# Patient Record
Sex: FEMALE | Race: WHITE | NOT HISPANIC OR LATINO | Employment: OTHER | URBAN - METROPOLITAN AREA
[De-identification: names, ages, dates, MRNs, and addresses within clinical notes are randomized per-mention and may not be internally consistent; named-entity substitution may affect disease eponyms.]

---

## 2024-04-25 ENCOUNTER — PATIENT OUTREACH (OUTPATIENT)
Dept: ONCOLOGY | Facility: CLINIC | Age: 59
End: 2024-04-25
Payer: MEDICARE

## 2024-04-25 ENCOUNTER — TRANSCRIBE ORDERS (OUTPATIENT)
Dept: OTHER | Age: 59
End: 2024-04-25

## 2024-04-25 DIAGNOSIS — C54.1 ENDOMETRIAL CANCER (H): Primary | ICD-10-CM

## 2024-04-25 NOTE — PROGRESS NOTES
New Patient Hematology / Oncology Nurse Navigator Note     Referral Date: 4/25/24    Referring provider: Dr MANDO Velásquez    Referring Clinic/Organization: [facility] / [facility]      Referred to: Gynecology Oncology    Requested provider (if applicable): First available - did not specify     Evaluation for :      Clinical History (per Nurse review of records provided):    4/17/24 path results -- BOOKMARKED      Clinical Assessment / Barriers to Care (Per Nurse):    None identified at time of call, discuss referral concerns and what to expect at initial consult visit.      Records Location: Care Everywhere     Records Needed:     See Pre-Visit encounter from CSS team for additional details on records collection. Additional questions on records needed for initial consult can be sent to P ON[facility] ADULT NEW PATIENT RECORDS [[id number]] with a copy to  CANCER CARE NURSE NAVIGATION [[id number]]. Please include diagnosis and urgency in subject line.    Additional testing needed prior to consult:     N/A    Referral updates and Plan:     At time of call, patient declined to schedule with [facility] as she is seeking care elsewhere.      Lori Pérez, MAHESHN, RN, PHN, OCN  Hematology/Oncology Nurse Navigator   [facility]   [phone]   [email or web address]

## 2024-05-16 ENCOUNTER — TRANSFERRED RECORDS (OUTPATIENT)
Dept: HEALTH INFORMATION MANAGEMENT | Facility: CLINIC | Age: 59
End: 2024-05-16

## 2024-06-02 NOTE — H&P (VIEW-ONLY)
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 DNA MMR all intact. She presents today for treatment recommendations.    Clinical Course:  21: Pap smear NILM.  24: Toney presented with postmenopausal bleeding for 7 months after not having any periods for 17 years. She has been having very light vaginal bleeding but does require a panty liner. Her bleeding has been regular about every 6 weeks and lasts a few weeks.  24: Endometrial biopsy was negative for hyperplasia, atypia, or malignancy.  ASCUS Pap smear, HPV-.  24: Pelvic ultrasound showed abnormal heterogeneous thickening of the postmenopausal endometrium measuring up to 23 mm with internal vascularity. Recommend gynecology consultation and tissue sampling. The ovaries are obscured by bowel gas.  24: She underwent a diagnostic hysteroscopy, D&C with polypectomy. 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 DNA MMR all intact.  Genetic Testing (Gyn Oncology):  N/A    Interval History (Gyn Oncology):  Toney presents today for treatment recommendations. She has been having postmenopausal bleeding light vaginal spotting for the last year or so. She had bleeding every 8 weeks. She had no abnormal bleeding  when she had periods. Her periods stopped at age 41.    Review of Systems:  A complete 14-point review of systems is negative except as noted in the above history of present illness.    Past Medical History:  Postmenopausal bleeding  Endometrial thickening  Hypothyroidism  Allergic rhinitis  Hyperlipidemia  Rheumatoid arthritis involving left ankle with +RA factor  Lyme disease  Elevated blood sugar  Prediabetes  Obesity  Bilateral knee osteoarthritis    Surgical History:  Right ankle fusion due to arthritis 2020  Left ankle fusion due to arthritis      Colonoscopy with polypectomy   Right knee replacement 2021  Left knee replacement   Endometrial biopsy 24  Diagnostic hysteroscopy, D&C with polypectomy 24    OB/Gyn History:  Menarche age: 11  .  x1 (twins). First live birth at 30  LMP: Age 41  Last mammogram Spring of 2023, normal  Last Pap smear 24, ASCUS, HPV-. Prior Pap smears NILM  Last colonoscopy with polypectomy   Denies history of HRT    Allergies#  Sulfamide    Medications:  Omega 3 Fatty Acids-Fish Oil Oral 684 mg-1,200 mg  Vitamin D3 (Cholecalciferol Oral) 50 mcg (2,000 unit) tablet  Calcium Phosphate-Vitamin D3 Oral Chewable 250 mg-5 mcg (200 unit)  Cytotec (Misoprostol Oral) 200 mcg tablet  Levothyroxine Oral 150 mcg capsule  Advil (Ibuprofen Oral) 200 mg tablet  Family History:  Maternal great grandaunts x2 - Breast cancers in their 40s and 80s  Maternal grandfather - Liver cancer (alcoholic/smoker)    Social History:    Sexually active  Lives in a house  Retired  Never smoker  Drinks moderate amount of alcohol  No illicit drug use    Health Maintenance:    Vital Signs:  Blood pressure: 146/86, Pulse: 98, Temperature: 97.9 F, Respirations: 16, O2 sat: 98%, Pain Scale: 0, Height: 67 in, Weight: 221 lb, BSA: 2.11, BMI: 34.61 kg/m2    Physical Exam (Gyn Oncology):  General: Alert and oriented x3, no acute distress. Obese.  HEENT:  Normocephalic, atraumatic.  Lymph node exam: No supraclavicular, submandibular, or cervical lymphadenopathy.  CV: Regular rate and rhythm, no murmurs, rubs or gallops.  Respiratory: Clear to auscultation bilaterally, no wheezes, rales, or rhonchi.  Abdomen: Soft, non-tender to palpation, no rebound or guarding.  Lower Extremity Exam: No lower extremity edema, no palpable cords.  Neuro: Cranial nerves II-XII intact, gross motor skills intact.  Genitourinary exam: Deferred.    Laboratory Data:  PATHOLOGY  24: Diagnostic Hysteroscopy, D&C with Polypectomy  FINAL DIAGNOSIS  A. Endometrium, endometrial shavings/polyp, curettage:  - Endometrioid adenocarcinoma, FIGO grade 1, with extensive squamous metaplasia  - Background of complex atypical hyperplasia involving endometrial polyp fragments  - Results of Immunostaining for Mismatch Repair Proteins (MMR), block A3:  - MLH1: Intact nuclear expression  - PMS2: Intact nuclear expression  - MSH2: Intact nuclear expression  - MSH6: Intact nuclear expression    B. Endometrium, endometrial curettings, curettage:  - Focal endometrioid adenocarcinoma, FIGO grade 1 in a background of complex atypical hyperplasia    Imagin24: US Pelvis  IMPRESSION:  Abnormal heterogeneous thickening of the postmenopausal endometrium measuring up to 23 mm with internal vascularity. Recommend gynecology consultation and tissue sampling. The ovaries are obscured by bowel gas.    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 DNA MMR all intact. She presents today for treatment recommendations.  FIGO grade 1 endometrioid adenocarcinoma of the uterus with extensive squamous  metaplasia, in a background of complex, atypical hyperplasia - Migdalia, her , and I reviewed her recent history of postmenopausal bleeding and diagnosis of a low-grade endometrial adenocarcinoma of the uterus. We reviewed underlying inciting factors and reviewed the general, good prognosis, associated with this diagnosis. We reviewed that most patients are diagnosed with stage 1 at the time of presentation and that most will not require any treatments after surgery. We reviewed the high probability of stage 1 disease at the time of presentation for surgery. We reviewed the standard-of-care to proceed with a robotic-assisted total laparoscopic hysterectomy with a bilateral salpingo-oophorectomy and sentinel lymph node injection and biopsy. We reviewed the risks of surgery, including bleeding or infection and potential damage to surrounding structures, including the bowel, bladder, and bilateral ureters. We reviewed general perioperative expectations and also discussed the need for a preoperative history and physical, prior to surgery. Migdalia would like her procedure performed for June 11??, due to having family in [facility], prior to the procedure. We will schedule her procedure for that day.  Thank you very much for allowing me to take part in the care of this very sweet patient.    3 minutes in reviewing records, 21 minutes in discussion, 2 minutes ordering labs.    Pain Care Management:  Pain Scale: 0    Patient Care needs:  Depressions Status: Not recorded on visit  Smoking Status: Smoking Tobacco : Never smoker; Smokeless Tobacco : none found; Vaping : none found  Documentation assistance provided by kim Jesus for Dr. Milagros Rae, on 5/16/2024.  I, Dr. Milagros Rae, personally performed the services described in this documentation, and it is both accurate and complete.    Milagros Rae MD  Phone: [phone]  Fax: [phone]

## 2024-06-02 NOTE — CONSULTS
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 DNA MMR all intact. She presents today for treatment recommendations.    Clinical Course:  21: Pap smear NILM.  24: Toney presented with postmenopausal bleeding for 7 months after not having any periods for 17 years. She has been having very light vaginal bleeding but does require a panty liner. Her bleeding has been regular about every 6 weeks and lasts a few weeks.  24: Endometrial biopsy was negative for hyperplasia, atypia, or malignancy.  ASCUS Pap smear, HPV-.  24: Pelvic ultrasound showed abnormal heterogeneous thickening of the postmenopausal endometrium measuring up to 23 mm with internal vascularity. Recommend gynecology consultation and tissue sampling. The ovaries are obscured by bowel gas.  24: She underwent a diagnostic hysteroscopy, D&C with polypectomy. 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 DNA MMR all intact.  Genetic Testing (Gyn Oncology):  N/A    Interval History (Gyn Oncology):  Toney presents today for treatment recommendations. She has been having postmenopausal bleeding light vaginal spotting for the last year or so. She had bleeding every 8 weeks. She had no abnormal bleeding  when she had periods. Her periods stopped at age 41.    Review of Systems:  A complete 14-point review of systems is negative except as noted in the above history of present illness.    Past Medical History:  Postmenopausal bleeding  Endometrial thickening  Hypothyroidism  Allergic rhinitis  Hyperlipidemia  Rheumatoid arthritis involving left ankle with +RA factor  Lyme disease  Elevated blood sugar  Prediabetes  Obesity  Bilateral knee osteoarthritis    Surgical History:  Right ankle fusion due to arthritis 2020  Left ankle fusion due to arthritis      Colonoscopy with polypectomy   Right knee replacement 2021  Left knee replacement   Endometrial biopsy 24  Diagnostic hysteroscopy, D&C with polypectomy 24    OB/Gyn History:  Menarche age: 11  .  x1 (twins). First live birth at 30  LMP: Age 41  Last mammogram Spring of 2023, normal  Last Pap smear 24, ASCUS, HPV-. Prior Pap smears NILM  Last colonoscopy with polypectomy   Denies history of HRT    Allergies#  Sulfamide    Medications:  Omega 3 Fatty Acids-Fish Oil Oral 684 mg-1,200 mg  Vitamin D3 (Cholecalciferol Oral) 50 mcg (2,000 unit) tablet  Calcium Phosphate-Vitamin D3 Oral Chewable 250 mg-5 mcg (200 unit)  Cytotec (Misoprostol Oral) 200 mcg tablet  Levothyroxine Oral 150 mcg capsule  Advil (Ibuprofen Oral) 200 mg tablet  Family History:  Maternal great grandaunts x2 - Breast cancers in their 40s and 80s  Maternal grandfather - Liver cancer (alcoholic/smoker)    Social History:    Sexually active  Lives in a house  Retired  Never smoker  Drinks moderate amount of alcohol  No illicit drug use    Health Maintenance:    Vital Signs:  Blood pressure: 146/86, Pulse: 98, Temperature: 97.9 F, Respirations: 16, O2 sat: 98%, Pain Scale: 0, Height: 67 in, Weight: 221 lb, BSA: 2.11, BMI: 34.61 kg/m2    Physical Exam (Gyn Oncology):  General: Alert and oriented x3, no acute distress. Obese.  HEENT:  Normocephalic, atraumatic.  Lymph node exam: No supraclavicular, submandibular, or cervical lymphadenopathy.  CV: Regular rate and rhythm, no murmurs, rubs or gallops.  Respiratory: Clear to auscultation bilaterally, no wheezes, rales, or rhonchi.  Abdomen: Soft, non-tender to palpation, no rebound or guarding.  Lower Extremity Exam: No lower extremity edema, no palpable cords.  Neuro: Cranial nerves II-XII intact, gross motor skills intact.  Genitourinary exam: Deferred.    Laboratory Data:  PATHOLOGY  24: Diagnostic Hysteroscopy, D&C with Polypectomy  FINAL DIAGNOSIS  A. Endometrium, endometrial shavings/polyp, curettage:  - Endometrioid adenocarcinoma, FIGO grade 1, with extensive squamous metaplasia  - Background of complex atypical hyperplasia involving endometrial polyp fragments  - Results of Immunostaining for Mismatch Repair Proteins (MMR), block A3:  - MLH1: Intact nuclear expression  - PMS2: Intact nuclear expression  - MSH2: Intact nuclear expression  - MSH6: Intact nuclear expression    B. Endometrium, endometrial curettings, curettage:  - Focal endometrioid adenocarcinoma, FIGO grade 1 in a background of complex atypical hyperplasia    Imagin24: US Pelvis  IMPRESSION:  Abnormal heterogeneous thickening of the postmenopausal endometrium measuring up to 23 mm with internal vascularity. Recommend gynecology consultation and tissue sampling. The ovaries are obscured by bowel gas.    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 DNA MMR all intact. She presents today for treatment recommendations.  FIGO grade 1 endometrioid adenocarcinoma of the uterus with extensive squamous  metaplasia, in a background of complex, atypical hyperplasia - Migdalia, her , and I reviewed her recent history of postmenopausal bleeding and diagnosis of a low-grade endometrial adenocarcinoma of the uterus. We reviewed underlying inciting factors and reviewed the general, good prognosis, associated with this diagnosis. We reviewed that most patients are diagnosed with stage 1 at the time of presentation and that most will not require any treatments after surgery. We reviewed the high probability of stage 1 disease at the time of presentation for surgery. We reviewed the standard-of-care to proceed with a robotic-assisted total laparoscopic hysterectomy with a bilateral salpingo-oophorectomy and sentinel lymph node injection and biopsy. We reviewed the risks of surgery, including bleeding or infection and potential damage to surrounding structures, including the bowel, bladder, and bilateral ureters. We reviewed general perioperative expectations and also discussed the need for a preoperative history and physical, prior to surgery. Migdalia would like her procedure performed for June 11??, due to having family in [facility], prior to the procedure. We will schedule her procedure for that day.  Thank you very much for allowing me to take part in the care of this very sweet patient.    3 minutes in reviewing records, 21 minutes in discussion, 2 minutes ordering labs.    Pain Care Management:  Pain Scale: 0    Patient Care needs:  Depressions Status: Not recorded on visit  Smoking Status: Smoking Tobacco : Never smoker; Smokeless Tobacco : none found; Vaping : none found  Documentation assistance provided by kim Jesus for Dr. Milagros Rae, on 5/16/2024.  I, Dr. Milagros Rae, personally performed the services described in this documentation, and it is both accurate and complete.    Milagros Rae MD  Phone: [phone]  Fax: [phone]

## 2024-06-07 RX ORDER — IBUPROFEN 200 MG
200 TABLET ORAL EVERY 4 HOURS PRN
Status: ON HOLD | COMMUNITY
End: 2024-06-11

## 2024-06-07 RX ORDER — CHLORAL HYDRATE 500 MG
2 CAPSULE ORAL DAILY
COMMUNITY

## 2024-06-07 RX ORDER — LEVOTHYROXINE SODIUM 150 UG/1
150 TABLET ORAL DAILY
COMMUNITY

## 2024-06-11 ENCOUNTER — ANESTHESIA (OUTPATIENT)
Dept: SURGERY | Facility: HOSPITAL | Age: 59
End: 2024-06-11
Payer: MEDICARE

## 2024-06-11 ENCOUNTER — ANESTHESIA EVENT (OUTPATIENT)
Dept: SURGERY | Facility: HOSPITAL | Age: 59
End: 2024-06-11
Payer: MEDICARE

## 2024-06-11 ENCOUNTER — HOSPITAL ENCOUNTER (OUTPATIENT)
Facility: HOSPITAL | Age: 59
Discharge: HOME | End: 2024-06-11
Attending: OBSTETRICS & GYNECOLOGY | Admitting: OBSTETRICS & GYNECOLOGY
Payer: MEDICARE

## 2024-06-11 VITALS
RESPIRATION RATE: 20 BRPM | DIASTOLIC BLOOD PRESSURE: 89 MMHG | SYSTOLIC BLOOD PRESSURE: 150 MMHG | TEMPERATURE: 98.2 F | HEART RATE: 78 BPM | OXYGEN SATURATION: 96 % | WEIGHT: 219.6 LBS | BODY MASS INDEX: 34.47 KG/M2 | HEIGHT: 67 IN

## 2024-06-11 DIAGNOSIS — C54.1 ENDOMETRIAL CANCER (H): Primary | ICD-10-CM

## 2024-06-11 LAB — HCG UR QL: NEGATIVE

## 2024-06-11 PROCEDURE — 710N000009 HC RECOVERY PHASE 1, LEVEL 1, PER MIN: Performed by: OBSTETRICS & GYNECOLOGY

## 2024-06-11 PROCEDURE — 250N000009 HC RX 250: Performed by: NURSE ANESTHETIST, CERTIFIED REGISTERED

## 2024-06-11 PROCEDURE — 250N000011 HC RX IP 250 OP 636: Performed by: OBSTETRICS & GYNECOLOGY

## 2024-06-11 PROCEDURE — 258N000001 HC RX 258: Performed by: OBSTETRICS & GYNECOLOGY

## 2024-06-11 PROCEDURE — 258N000003 HC RX IP 258 OP 636: Performed by: ANESTHESIOLOGY

## 2024-06-11 PROCEDURE — 88307 TISSUE EXAM BY PATHOLOGIST: CPT | Mod: TC | Performed by: OBSTETRICS & GYNECOLOGY

## 2024-06-11 PROCEDURE — 710N000012 HC RECOVERY PHASE 2, PER MINUTE: Performed by: OBSTETRICS & GYNECOLOGY

## 2024-06-11 PROCEDURE — 81025 URINE PREGNANCY TEST: CPT | Performed by: PHYSICIAN ASSISTANT

## 2024-06-11 PROCEDURE — 999N000141 HC STATISTIC PRE-PROCEDURE NURSING ASSESSMENT: Performed by: OBSTETRICS & GYNECOLOGY

## 2024-06-11 PROCEDURE — 258N000003 HC RX IP 258 OP 636: Performed by: NURSE ANESTHETIST, CERTIFIED REGISTERED

## 2024-06-11 PROCEDURE — 272N000001 HC OR GENERAL SUPPLY STERILE: Performed by: OBSTETRICS & GYNECOLOGY

## 2024-06-11 PROCEDURE — 370N000017 HC ANESTHESIA TECHNICAL FEE, PER MIN: Performed by: OBSTETRICS & GYNECOLOGY

## 2024-06-11 PROCEDURE — 250N000025 HC SEVOFLURANE, PER MIN: Performed by: OBSTETRICS & GYNECOLOGY

## 2024-06-11 PROCEDURE — 250N000009 HC RX 250: Performed by: OBSTETRICS & GYNECOLOGY

## 2024-06-11 PROCEDURE — 250N000011 HC RX IP 250 OP 636: Mod: JZ | Performed by: NURSE ANESTHETIST, CERTIFIED REGISTERED

## 2024-06-11 PROCEDURE — 360N000080 HC SURGERY LEVEL 7, PER MIN: Performed by: OBSTETRICS & GYNECOLOGY

## 2024-06-11 PROCEDURE — 250N000011 HC RX IP 250 OP 636: Mod: JZ | Performed by: PHYSICIAN ASSISTANT

## 2024-06-11 PROCEDURE — 88342 IMHCHEM/IMCYTCHM 1ST ANTB: CPT | Mod: TC,59 | Performed by: OBSTETRICS & GYNECOLOGY

## 2024-06-11 PROCEDURE — 250N000011 HC RX IP 250 OP 636: Performed by: PHYSICIAN ASSISTANT

## 2024-06-11 RX ORDER — FENTANYL CITRATE 50 UG/ML
INJECTION, SOLUTION INTRAMUSCULAR; INTRAVENOUS PRN
Status: DISCONTINUED | OUTPATIENT
Start: 2024-06-11 | End: 2024-06-11

## 2024-06-11 RX ORDER — CEFAZOLIN SODIUM/WATER 2 G/20 ML
2 SYRINGE (ML) INTRAVENOUS
Status: COMPLETED | OUTPATIENT
Start: 2024-06-11 | End: 2024-06-11

## 2024-06-11 RX ORDER — OXYCODONE HYDROCHLORIDE 5 MG/1
10 TABLET ORAL
Status: CANCELLED | OUTPATIENT
Start: 2024-06-11

## 2024-06-11 RX ORDER — BUPIVACAINE HYDROCHLORIDE 5 MG/ML
INJECTION, SOLUTION PERINEURAL PRN
Status: DISCONTINUED | OUTPATIENT
Start: 2024-06-11 | End: 2024-06-11 | Stop reason: HOSPADM

## 2024-06-11 RX ORDER — PROPOFOL 10 MG/ML
INJECTION, EMULSION INTRAVENOUS CONTINUOUS PRN
Status: DISCONTINUED | OUTPATIENT
Start: 2024-06-11 | End: 2024-06-11

## 2024-06-11 RX ORDER — ONDANSETRON 4 MG/1
4 TABLET, ORALLY DISINTEGRATING ORAL EVERY 30 MIN PRN
Status: CANCELLED | OUTPATIENT
Start: 2024-06-11

## 2024-06-11 RX ORDER — NALOXONE HYDROCHLORIDE 0.4 MG/ML
0.1 INJECTION, SOLUTION INTRAMUSCULAR; INTRAVENOUS; SUBCUTANEOUS
Status: DISCONTINUED | OUTPATIENT
Start: 2024-06-11 | End: 2024-06-11 | Stop reason: HOSPADM

## 2024-06-11 RX ORDER — KETAMINE HYDROCHLORIDE 10 MG/ML
INJECTION INTRAMUSCULAR; INTRAVENOUS PRN
Status: DISCONTINUED | OUTPATIENT
Start: 2024-06-11 | End: 2024-06-11

## 2024-06-11 RX ORDER — PROPOFOL 10 MG/ML
INJECTION, EMULSION INTRAVENOUS PRN
Status: DISCONTINUED | OUTPATIENT
Start: 2024-06-11 | End: 2024-06-11

## 2024-06-11 RX ORDER — ACETAMINOPHEN 325 MG/1
975 TABLET ORAL EVERY 6 HOURS PRN
Status: CANCELLED | OUTPATIENT
Start: 2024-06-11

## 2024-06-11 RX ORDER — AMOXICILLIN 250 MG
1-2 CAPSULE ORAL 2 TIMES DAILY PRN
COMMUNITY
Start: 2024-06-11

## 2024-06-11 RX ORDER — IBUPROFEN 200 MG
800 TABLET ORAL EVERY 6 HOURS PRN
Status: CANCELLED | OUTPATIENT
Start: 2024-06-11

## 2024-06-11 RX ORDER — HYDROMORPHONE HCL IN WATER/PF 6 MG/30 ML
0.4 PATIENT CONTROLLED ANALGESIA SYRINGE INTRAVENOUS EVERY 5 MIN PRN
Status: DISCONTINUED | OUTPATIENT
Start: 2024-06-11 | End: 2024-06-11 | Stop reason: HOSPADM

## 2024-06-11 RX ORDER — LIDOCAINE 40 MG/G
CREAM TOPICAL
Status: DISCONTINUED | OUTPATIENT
Start: 2024-06-11 | End: 2024-06-11 | Stop reason: HOSPADM

## 2024-06-11 RX ORDER — DEXAMETHASONE SODIUM PHOSPHATE 10 MG/ML
INJECTION, SOLUTION INTRAMUSCULAR; INTRAVENOUS PRN
Status: DISCONTINUED | OUTPATIENT
Start: 2024-06-11 | End: 2024-06-11

## 2024-06-11 RX ORDER — IBUPROFEN 200 MG
600 TABLET ORAL EVERY 6 HOURS PRN
COMMUNITY
Start: 2024-06-11

## 2024-06-11 RX ORDER — SODIUM CHLORIDE, SODIUM LACTATE, POTASSIUM CHLORIDE, CALCIUM CHLORIDE 600; 310; 30; 20 MG/100ML; MG/100ML; MG/100ML; MG/100ML
INJECTION, SOLUTION INTRAVENOUS CONTINUOUS
Status: DISCONTINUED | OUTPATIENT
Start: 2024-06-11 | End: 2024-06-11 | Stop reason: HOSPADM

## 2024-06-11 RX ORDER — METRONIDAZOLE 500 MG/100ML
500 INJECTION, SOLUTION INTRAVENOUS
Status: COMPLETED | OUTPATIENT
Start: 2024-06-11 | End: 2024-06-11

## 2024-06-11 RX ORDER — DEXAMETHASONE SODIUM PHOSPHATE 4 MG/ML
4 INJECTION, SOLUTION INTRA-ARTICULAR; INTRALESIONAL; INTRAMUSCULAR; INTRAVENOUS; SOFT TISSUE
Status: DISCONTINUED | OUTPATIENT
Start: 2024-06-11 | End: 2024-06-11 | Stop reason: HOSPADM

## 2024-06-11 RX ORDER — ONDANSETRON 4 MG/1
4 TABLET, ORALLY DISINTEGRATING ORAL EVERY 30 MIN PRN
Status: DISCONTINUED | OUTPATIENT
Start: 2024-06-11 | End: 2024-06-11 | Stop reason: HOSPADM

## 2024-06-11 RX ORDER — KETOROLAC TROMETHAMINE 30 MG/ML
INJECTION, SOLUTION INTRAMUSCULAR; INTRAVENOUS PRN
Status: DISCONTINUED | OUTPATIENT
Start: 2024-06-11 | End: 2024-06-11

## 2024-06-11 RX ORDER — ONDANSETRON 2 MG/ML
4 INJECTION INTRAMUSCULAR; INTRAVENOUS EVERY 30 MIN PRN
Status: CANCELLED | OUTPATIENT
Start: 2024-06-11

## 2024-06-11 RX ORDER — FENTANYL CITRATE 50 UG/ML
25 INJECTION, SOLUTION INTRAMUSCULAR; INTRAVENOUS EVERY 5 MIN PRN
Status: DISCONTINUED | OUTPATIENT
Start: 2024-06-11 | End: 2024-06-11 | Stop reason: HOSPADM

## 2024-06-11 RX ORDER — HYDROMORPHONE HCL IN WATER/PF 6 MG/30 ML
0.2 PATIENT CONTROLLED ANALGESIA SYRINGE INTRAVENOUS EVERY 5 MIN PRN
Status: DISCONTINUED | OUTPATIENT
Start: 2024-06-11 | End: 2024-06-11 | Stop reason: HOSPADM

## 2024-06-11 RX ORDER — FENTANYL CITRATE 50 UG/ML
50 INJECTION, SOLUTION INTRAMUSCULAR; INTRAVENOUS EVERY 5 MIN PRN
Status: DISCONTINUED | OUTPATIENT
Start: 2024-06-11 | End: 2024-06-11 | Stop reason: HOSPADM

## 2024-06-11 RX ORDER — CEFAZOLIN SODIUM/WATER 2 G/20 ML
2 SYRINGE (ML) INTRAVENOUS SEE ADMIN INSTRUCTIONS
Status: DISCONTINUED | OUTPATIENT
Start: 2024-06-11 | End: 2024-06-11 | Stop reason: HOSPADM

## 2024-06-11 RX ORDER — OXYCODONE HYDROCHLORIDE 5 MG/1
5 TABLET ORAL EVERY 4 HOURS PRN
Qty: 8 TABLET | Refills: 0 | Status: SHIPPED | OUTPATIENT
Start: 2024-06-11

## 2024-06-11 RX ORDER — INDOCYANINE GREEN AND WATER 25 MG
KIT INJECTION PRN
Status: DISCONTINUED | OUTPATIENT
Start: 2024-06-11 | End: 2024-06-11 | Stop reason: HOSPADM

## 2024-06-11 RX ORDER — WATER 10 ML/10ML
INJECTION INTRAMUSCULAR; INTRAVENOUS; SUBCUTANEOUS PRN
Status: DISCONTINUED | OUTPATIENT
Start: 2024-06-11 | End: 2024-06-11 | Stop reason: HOSPADM

## 2024-06-11 RX ORDER — OXYCODONE HYDROCHLORIDE 5 MG/1
5 TABLET ORAL
Status: CANCELLED | OUTPATIENT
Start: 2024-06-11

## 2024-06-11 RX ORDER — ONDANSETRON 2 MG/ML
INJECTION INTRAMUSCULAR; INTRAVENOUS PRN
Status: DISCONTINUED | OUTPATIENT
Start: 2024-06-11 | End: 2024-06-11

## 2024-06-11 RX ORDER — NALOXONE HYDROCHLORIDE 0.4 MG/ML
0.1 INJECTION, SOLUTION INTRAMUSCULAR; INTRAVENOUS; SUBCUTANEOUS
Status: CANCELLED | OUTPATIENT
Start: 2024-06-11

## 2024-06-11 RX ORDER — DEXAMETHASONE SODIUM PHOSPHATE 10 MG/ML
4 INJECTION, SOLUTION INTRAMUSCULAR; INTRAVENOUS
Status: CANCELLED | OUTPATIENT
Start: 2024-06-11

## 2024-06-11 RX ORDER — HYDROXYZINE HYDROCHLORIDE 25 MG/1
25 TABLET, FILM COATED ORAL
Status: CANCELLED | OUTPATIENT
Start: 2024-06-11

## 2024-06-11 RX ORDER — LIDOCAINE HYDROCHLORIDE 10 MG/ML
INJECTION, SOLUTION INFILTRATION; PERINEURAL PRN
Status: DISCONTINUED | OUTPATIENT
Start: 2024-06-11 | End: 2024-06-11

## 2024-06-11 RX ORDER — ONDANSETRON 2 MG/ML
4 INJECTION INTRAMUSCULAR; INTRAVENOUS EVERY 30 MIN PRN
Status: DISCONTINUED | OUTPATIENT
Start: 2024-06-11 | End: 2024-06-11 | Stop reason: HOSPADM

## 2024-06-11 RX ORDER — ACETAMINOPHEN 500 MG
500-1000 TABLET ORAL EVERY 6 HOURS PRN
COMMUNITY
Start: 2024-06-11

## 2024-06-11 RX ADMIN — KETAMINE HYDROCHLORIDE 20 MG: 10 INJECTION INTRAMUSCULAR; INTRAVENOUS at 11:31

## 2024-06-11 RX ADMIN — Medication 2 G: at 11:19

## 2024-06-11 RX ADMIN — ROCURONIUM BROMIDE 50 MG: 50 INJECTION, SOLUTION INTRAVENOUS at 11:31

## 2024-06-11 RX ADMIN — SODIUM CHLORIDE, POTASSIUM CHLORIDE, SODIUM LACTATE AND CALCIUM CHLORIDE: 600; 310; 30; 20 INJECTION, SOLUTION INTRAVENOUS at 13:01

## 2024-06-11 RX ADMIN — ROCURONIUM BROMIDE 20 MG: 50 INJECTION, SOLUTION INTRAVENOUS at 12:14

## 2024-06-11 RX ADMIN — ONDANSETRON 4 MG: 2 INJECTION INTRAMUSCULAR; INTRAVENOUS at 13:09

## 2024-06-11 RX ADMIN — KETAMINE HYDROCHLORIDE 30 MG: 10 INJECTION INTRAMUSCULAR; INTRAVENOUS at 11:54

## 2024-06-11 RX ADMIN — PHENYLEPHRINE HYDROCHLORIDE 150 MCG: 10 INJECTION INTRAVENOUS at 11:58

## 2024-06-11 RX ADMIN — KETOROLAC TROMETHAMINE 15 MG: 30 INJECTION, SOLUTION INTRAMUSCULAR at 13:09

## 2024-06-11 RX ADMIN — PROPOFOL 170 MG: 10 INJECTION, EMULSION INTRAVENOUS at 11:31

## 2024-06-11 RX ADMIN — PHENYLEPHRINE HYDROCHLORIDE 100 MCG: 10 INJECTION INTRAVENOUS at 11:49

## 2024-06-11 RX ADMIN — LIDOCAINE HYDROCHLORIDE 5 ML: 10 INJECTION, SOLUTION INFILTRATION; PERINEURAL at 11:31

## 2024-06-11 RX ADMIN — DEXAMETHASONE SODIUM PHOSPHATE 10 MG: 10 INJECTION, SOLUTION INTRAMUSCULAR; INTRAVENOUS at 11:32

## 2024-06-11 RX ADMIN — DEXMEDETOMIDINE HYDROCHLORIDE 20 MCG: 100 INJECTION, SOLUTION INTRAVENOUS at 11:19

## 2024-06-11 RX ADMIN — PHENYLEPHRINE HYDROCHLORIDE 100 MCG: 10 INJECTION INTRAVENOUS at 12:00

## 2024-06-11 RX ADMIN — METRONIDAZOLE 500 MG: 500 INJECTION, SOLUTION INTRAVENOUS at 10:03

## 2024-06-11 RX ADMIN — SUGAMMADEX 200 MG: 100 INJECTION, SOLUTION INTRAVENOUS at 13:19

## 2024-06-11 RX ADMIN — ROCURONIUM BROMIDE 20 MG: 50 INJECTION, SOLUTION INTRAVENOUS at 11:54

## 2024-06-11 RX ADMIN — PHENYLEPHRINE HYDROCHLORIDE 0.5 MCG/KG/MIN: 10 INJECTION INTRAVENOUS at 12:03

## 2024-06-11 RX ADMIN — PROPOFOL 100 MCG/KG/MIN: 10 INJECTION, EMULSION INTRAVENOUS at 11:43

## 2024-06-11 RX ADMIN — FENTANYL CITRATE 100 MCG: 50 INJECTION INTRAMUSCULAR; INTRAVENOUS at 11:31

## 2024-06-11 RX ADMIN — SODIUM CHLORIDE, POTASSIUM CHLORIDE, SODIUM LACTATE AND CALCIUM CHLORIDE: 600; 310; 30; 20 INJECTION, SOLUTION INTRAVENOUS at 10:04

## 2024-06-11 ASSESSMENT — ACTIVITIES OF DAILY LIVING (ADL)
ADLS_ACUITY_SCORE: 20
ADLS_ACUITY_SCORE: 20
ADLS_ACUITY_SCORE: 27
ADLS_ACUITY_SCORE: 20

## 2024-06-11 NOTE — ANESTHESIA PREPROCEDURE EVALUATION
Anesthesia Pre-Procedure Evaluation    Patient: Migdalia Padilla   MRN: [id number] : 1965        Procedure : Procedure(s):  ROBOTIC ASSISTED TOTAL LAPAROSCOPIC HYSTERECTOMY,  BILATERAL SALPINGO OOPHORECTOMY,  SENTINEL LYMPH NODE INJECTION AND BIOPSY          Past Medical History:   Diagnosis Date    Allergic rhinitis     Endometrial cancer (H)     Hyperlipidemia     Hypothyroidism     Rheumatoid arthritis involving left ankle with positive rheumatoid factor (H)       Past Surgical History:   Procedure Laterality Date    ANKLE FUSION Left       ANKLE FUSION Right 2020      BIOPSY ENDOMETRIAL 2024       SECTION       COLONOSCOPY W/ POLYPECTOMY 2016      COMBINED HYSTEROSCOPY DIAGNOSTIC / D&C 2024      KNEE REPLACEMENT Left       KNEE REPLACEMENT Right 2021        Allergies   Allergen Reactions    Sulfa Antibiotics Rash      Social History     Tobacco Use    Smoking status: Never    Smokeless tobacco: Never   Substance Use Topics    Alcohol use: Yes     Alcohol/week: 2.0 standard drinks of alcohol     Types: 2 Standard drinks or equivalent per week     Comment: 1/week      Wt Readings from Last 1 Encounters:   24 99.6 kg (219 lb 9.6 oz)        Anesthesia Evaluation            ROS/MED HX  ENT/Pulmonary:  - neg pulmonary ROS     Neurologic:  - neg neurologic ROS     Cardiovascular:  - neg cardiovascular ROS     METS/Exercise Tolerance: >4 METS    Hematologic:  - neg hematologic  ROS     Musculoskeletal:  - neg musculoskeletal ROS     GI/Hepatic:  - neg GI/hepatic ROS     Renal/Genitourinary:  - neg Renal ROS     Endo:  - neg endo ROS   (+)          thyroid problem,            Psychiatric/Substance Use:  - neg psychiatric ROS     Infectious Disease:  - neg infectious disease ROS     Malignancy:  - neg malignancy ROS     Other:  - neg other ROS          Physical Exam    Airway        Mallampati: II    Neck ROM: full     Respiratory Devices and Support         Dental    "        Cardiovascular   cardiovascular exam normal          Pulmonary   pulmonary exam normal                OUTSIDE LABS:  CBC: No results found for: \"WBC\", \"HGB\", \"HCT\", \"PLT\"  BMP: No results found for: \"NA\", \"POTASSIUM\", \"CHLORIDE\", \"CO2\", \"BUN\", \"CR\", \"GLC\"  COAGS: No results found for: \"PTT\", \"INR\", \"FIBR\"  POC:   Lab Results   Component Value Date    HCG Negative 06/11/2024     HEPATIC: No results found for: \"ALBUMIN\", \"PROTTOTAL\", \"ALT\", \"AST\", \"GGT\", \"ALKPHOS\", \"BILITOTAL\", \"BILIDIRECT\", \"HANNAH\"  OTHER: No results found for: \"PH\", \"LACT\", \"A1C\", \"IKER\", \"PHOS\", \"MAG\", \"LIPASE\", \"AMYLASE\", \"TSH\", \"T4\", \"T3\", \"CRP\", \"SED\"    Anesthesia Plan    ASA Status:  2       Anesthesia Type: General.     - Airway: ETT   Induction: Intravenous.           Consents    Anesthesia Plan(s) and associated risks, benefits, and realistic alternatives discussed. Questions answered and patient/representative(s) expressed understanding.     - Discussed:     - Discussed with:  Patient            Postoperative Care       PONV prophylaxis: Ondansetron (or other 5HT-3), Dexamethasone or Solumedrol     Comments:               Marbin Chauhan MD    I have reviewed the pertinent notes and labs in the chart from the past 30 days and (re)examined the patient.  Any updates or changes from those notes are reflected in this note.              # Obesity: Estimated body mass index is 34.39 kg/m  as calculated from the following:    Height as of this encounter: 1.702 m (5' 7\").    Weight as of this encounter: 99.6 kg (219 lb 9.6 oz).      "

## 2024-06-11 NOTE — INTERVAL H&P NOTE
I have seen and examined the patient. There are no updates or changes to the preoperative history and physical.    Milagros Rae MD  Gynecologic Oncology  [facility]  [facility]: [phone]

## 2024-06-11 NOTE — ANESTHESIA POSTPROCEDURE EVALUATION
Patient: Migdalia Padilla    Procedure: Procedure(s):  ROBOTIC ASSISTED TOTAL LAPAROSCOPIC HYSTERECTOMY,  BILATERAL SALPINGO OOPHORECTOMY,  SENTINEL LYMPH NODE INJECTION AND BIOPSY       Anesthesia Type:  General    Note:  Disposition: Outpatient   Postop Pain Control: Uneventful            Sign Out: Well controlled pain   PONV: No   Neuro/Psych: Uneventful            Sign Out: Acceptable/Baseline neuro status   Airway/Respiratory: Uneventful            Sign Out: Acceptable/Baseline resp. status   CV/Hemodynamics: Uneventful            Sign Out: Acceptable CV status; No obvious hypovolemia; No obvious fluid overload   Other NRE:    DID A NON-ROUTINE EVENT OCCUR?            Last vitals:  Vitals Value Taken Time   /80 06/11/24 1330   Temp 36.4  C (97.6  F) 06/11/24 1330   Pulse 74 06/11/24 1337   Resp 44 06/11/24 1334   SpO2 100 % 06/11/24 1337   Vitals shown include unfiled device data.    Electronically Signed By: Marbin Chauhan MD  June 11, 2024  1:39 PM

## 2024-06-11 NOTE — ANESTHESIA PROCEDURE NOTES
Airway       Patient location during procedure: OR       Procedure Start/Stop Times: 6/11/2024 11:35 AM  Staff -        CRNA: Denise Solano APRN CRNA       Performed By: CRNA  Consent for Airway        Urgency: elective  Indications and Patient Condition       Indications for airway management: jeniffer-procedural       Induction type:intravenous       Mask difficulty assessment: 2 - vent by mask + OA or adjuvant +/- NMBA    Final Airway Details       Final airway type: endotracheal airway       Successful airway: ETT - single and Oral  Endotracheal Airway Details        ETT size (mm): 7.0       Cuffed: yes       Cuff volume (mL): 5       Successful intubation technique: video laryngoscopy       VL Blade Size: Glidescope 4       Grade View of Cords: 1       Adjucts: stylet       Position: Right       Measured from: lips       Secured at (cm): 22       Bite block used: None    Post intubation assessment        Placement verified by: capnometry, equal breath sounds and chest rise        Number of attempts at approach: 1       Number of other approaches attempted: 0       Secured with: tape       Ease of procedure: easy       Dentition: Intact and Unchanged    Medication(s) Administered   Medication Administration Time: 6/11/2024 11:35 AM

## 2024-06-11 NOTE — PROCEDURES
POST OPERATIVE NOTE    Preoperative Diagnosis:  Endometrial cancer (H) [C54.1]    Postoperative Diagnosis:  Same    CLINICAL STAGE: T: 1  N: x M: 0   FIGO: 1    NATIONAL GUIDELINE REFERENCED FOR TREATMENT PLANNING: NCCN    Procedures:  Robotic assisted laparoscopic total hysterectomy  Bilateral salphingoophorectomy  [place] Lymph Node ICG Injection and Biopsy    Prosthetic Devices:  None    Surgeon(s) and Assistants (if any):  Surgeon(s):  Eleanor Moore PA-C Thomes Pepin, Jessica Ann, MD  Circulator: Bobbi Nice RN  Relief Circulator: Sinai Jenkins RN  Relief Scrub: Prema Modi  Scrub Person: Luna Briggs    Anesthesia:  General    Drains:  none    Specimens:   [place] lymph node #1 and 2 right external iliac vein  [place] lymph node #1 left external iliac artery   [place] lymph node #2 left iliac bifurcation  Uterus, cervix, bilateral fallopian tubes and ovaries    Complications: none    Findings/Conclusions:  Normal peritoneal cavity. Normal bilateral diaphragms and liver capsule. Normal appearing stomach and omentum without nodularity. The bowel, intestines were both within normal limits. The appendix was not visualized. The bilateral adnexa were both within normal limits as was the uterus.  The first sentinel lymph node on the right was mildly enlarged over baseline.    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  DNA mismatch repair enzymes were all intact.  She was counseled as a standard of care recommended procedure.  The risks, benefits and alternatives were discussed in detail.  The consent was signed in the preoperative area.  The patient was subsequently brought to the operating room where general anesthesia was found to be adequate. She was prepared and draped in the  normal sterile fashion in lithotomy positioning. Her arms were padded and tucked at her sides with arm boards.  A briefing and timeout were performed in the room and the staff were educated as to the planned procedure.     At the start of the case, the cervix was grasped with a single toothed tenaculum and injected at 3 and 9 o'clock with 1 ml of a 1.5 mg/ml concentration of ICG 1 cm deep and additionally submucosally. A total of 4 ml was used. A stitch was placed across the anterior lip of the cervix. The small V-care uterine manipulator was placed without difficulty. The suture was tied to the uterine manipulator. Attention was then turned to the abdomen.      A supraumbilical incision was made roughly 27cm above the pubic symphysis. The 5 mm laparoscopic camera was introduced into the abdomen with a robotic 8 mm obturator and sleeve. Following confirmation of entrance into the abdomen by visual inspection of the intraperitoneal space, the abdomen was insufflated to 15 mm Hg. The patient was placed in steep trendelenberg. Additional port sites were mapped out 11 cm lateral with a 15 degree drop to each side of the camera port site. The right lower quadrant port site for assistant manipulation was placed 2 cm cephalad and medial to the ASIS. The assist port site was an 8 mm Airseal trochar. The left lower quadrant port site was mapped out an additional 11 cm lateral to the mid-left port site. A survey of the abdomen and pelvis was performed. The bowel was folded into the upper abdomen. The robot was then docked.      The para-rectal and para-vesical spaces were opened and developed. The right round ligament was grasped, and transected with monopolar energy. The peritoneum was divided just parallel and lateral to the gonadal vessels. By retracting the posterior leaf medially, the ureter was identified coursing deep within the posterior leaf of the broad ligament, and the para-rectal space was developed between the  ureter medially, and the hypogastric artery laterally. The paravesical space on the right was developed by dissecting just lateral to the obliterated umbilical ligament. Attention was turned to identifying the sentinel lymph node, and the infra-red firefly camera was turned on. The lymphatics were identified on the right coursing directly to the first sentinel lymph node ending over the right external iliac vein.  There was a second adjacent sentinel lymph node lateral to this along the right external iliac vein which was also dissected at the same time.  These 2 lymph nodes were dissected without any issue along the length of the inferior aspect of the right external iliac artery and from the underlying right external iliac vein.  There was a very small amount of bleeding along the distalmost aspect of the vein which was coagulated with bipolar cautery.  Both sentinel lymph nodes were collected within a reasonable 8 mm Endo Catch bag and delivered through the right lower quadrant assistant trocar site.  Both were sent together as a specimen as sentinel lymph node #1 and 2 right external iliac vein.  Attention was turned to the patient's left pelvic sidewall. The left pelvic peritoneum was opened and developed in a similar fashion to the right. The left pararectal and paravesical spaces were opened and developed in a similar fashion. The ureter was identified within its usual position. The firefly camera was activated and the first sentinel lymph node was identified over the left external iliac artery at the mid position.  This lymph node was dissected without any issue and collected within an Endo Catch bag and delivered through the right lower quadrant assistant trocar site.  A second sentinel lymph node was identified more proximal to the iliac bifurcation.  These lymph nodes were substantially smaller and collected as sentinel lymph node #2, left iliac bifurcation.  The lymph node was collected with a grasper  and delivered through the right lower quadrant assistant trocar site.       Attention was then turned to the hysterectomy portion of the case, which was initiated by visualizing the ureter again along the posterior medial leaf on the left. The gray space bound by the IP ligament, the utero-ovarian ligament and the ureter was then opened sharply. The IP ligament with the gonadal vessels was identified and skeletonized. The gonadal vessels were then cauterized with the bipolar grasper and transected. The posterior leaf was taken down to the uterosacral ligament posteriorly. Attention was turned to the right gonadal vessels. In a similar fashion, the gray space was developed and the gonadal vessels skeletonized. The gonadal vessels were coagulated, sealed and transected in a similar fashion.  There was a small amount of bleeding from the right gonadal vessels.  The posterior leaf was similarly dissected to skeletonize the uterine vessels posteriorly. Attention was then turned anterior. The anterior leaf was dissected to initiate the bladder flap.The bladder flap was developed by dissecting the vesicouterine peritoneum, and following, the pubocervical fascia was dissected over the anterior cervix to fully expose the anterior colpotomy ring.  The uterine vessels were then coagulated, sealed and transected bilaterally. The cardinal ligaments were coagulated, sealed and transected just parallel to the uterine cervix. The colpotomy was initiated and circumferentially completed. Following, the uterus, cervix, bilateral fallopian tubes and ovaries were delivered vaginally. The vagina was then closed with a Stratafix 0-PDS barbed unidirectional suture. The specimens were all sent for routine processing.  The pelvis was irrigated and all surgical sites were felt to be hemostatic.     All robotic instruments were removed from the patient's abdomen, and the robot was un-docked. The skin incisions were re-approximated with a 3-0  subcuticular stitch followed by an overlying layer of dermabond. All sites were injected with 0.25% marcaine.     The vagina was inspected and found to be hemostatic. All sponges, needles and instrument counts were correct x2. She was taken to the recovery room in a stable condition.      Eleanor Moore PA-C  assisted me throughout the case and was paramount in the completion of all vital portions. She assisted with injection of the cervix, placement of the uterine manipulator, retraction, adequate visualization and closure.       Estimated Blood Loss: 30 mL    Condition on discharge from OR:  Satisfactory      Milagros Rae MD   On Behalf of  Surgeon(s):  Eleanor Moore PA-C Thomes Pepin, Jessica Ann, MD

## 2024-06-11 NOTE — ANESTHESIA CARE TRANSFER NOTE
Patient: Migdalia Padilla    Procedure: Procedure(s):  ROBOTIC ASSISTED TOTAL LAPAROSCOPIC HYSTERECTOMY,  BILATERAL SALPINGO OOPHORECTOMY,  SENTINEL LYMPH NODE INJECTION AND BIOPSY       Diagnosis: Endometrial cancer (H) [C54.1]  Diagnosis Additional Information: No value filed.    Anesthesia Type:   General     Note:    Oropharynx: oropharynx clear of all foreign objects and spontaneously breathing  Level of Consciousness: awake  Oxygen Supplementation: face mask  Level of Supplemental Oxygen (L/min / FiO2): 8  Independent Airway: airway patency satisfactory and stable  Dentition: dentition unchanged  Vital Signs Stable: post-procedure vital signs reviewed and stable  Report to RN Given: handoff report given  Patient transferred to: PACU    Handoff Report: Identifed the Patient, Identified the Reponsible Provider, Reviewed the pertinent medical history, Discussed the surgical course, Reviewed Intra-OP anesthesia mangement and issues during anesthesia, Set expectations for post-procedure period and Allowed opportunity for questions and acknowledgement of understanding    Vitals:  Vitals Value Taken Time   /80 06/11/24 1328   Temp     Pulse 75 06/11/24 1330   Resp 31 06/11/24 1329   SpO2 100 % 06/11/24 1330   Vitals shown include unfiled device data.    Electronically Signed By: GEOFFREY Kumar CRNA  June 11, 2024  1:31 PM

## 2024-06-17 LAB
INTERPRETATION: NORMAL
LOCATION OF TASK: NORMAL
SIGNIFICANT RESULTS: NORMAL
SPECIMEN DESCRIPTION: NORMAL
TEST DETAILS, MDL: NORMAL

## 2024-06-17 PROCEDURE — 88307 TISSUE EXAM BY PATHOLOGIST: CPT | Mod: 26 | Performed by: PATHOLOGY

## 2024-06-17 PROCEDURE — 88360 TUMOR IMMUNOHISTOCHEM/MANUAL: CPT | Mod: TC | Performed by: OBSTETRICS & GYNECOLOGY

## 2024-06-17 PROCEDURE — 81479 UNLISTED MOLECULAR PATHOLOGY: CPT | Performed by: OBSTETRICS & GYNECOLOGY

## 2024-06-17 PROCEDURE — 88360 TUMOR IMMUNOHISTOCHEM/MANUAL: CPT | Mod: 26 | Performed by: PATHOLOGY

## 2024-06-17 PROCEDURE — 88309 TISSUE EXAM BY PATHOLOGIST: CPT | Mod: 26 | Performed by: PATHOLOGY

## 2024-06-17 PROCEDURE — 88342 IMHCHEM/IMCYTCHM 1ST ANTB: CPT | Mod: 26 | Performed by: PATHOLOGY

## 2024-06-18 PROCEDURE — G0452 MOLECULAR PATHOLOGY INTERPR: HCPCS | Mod: 26 | Performed by: STUDENT IN AN ORGANIZED HEALTH CARE EDUCATION/TRAINING PROGRAM

## 2024-06-27 LAB
PATH REPORT.ADDENDUM SPEC: ABNORMAL
PATH REPORT.ADDENDUM SPEC: ABNORMAL
PATH REPORT.COMMENTS IMP SPEC: ABNORMAL
PATH REPORT.COMMENTS IMP SPEC: ABNORMAL
PATH REPORT.COMMENTS IMP SPEC: YES
PATH REPORT.FINAL DX SPEC: ABNORMAL
PATH REPORT.GROSS SPEC: ABNORMAL
PATH REPORT.MICROSCOPIC SPEC OTHER STN: ABNORMAL
PATH REPORT.RELEVANT HX SPEC: ABNORMAL
PATHOLOGY SYNOPTIC REPORT: ABNORMAL
PHOTO IMAGE: ABNORMAL

## 2024-06-27 PROCEDURE — 88342 IMHCHEM/IMCYTCHM 1ST ANTB: CPT | Mod: 26 | Performed by: PATHOLOGY

## (undated) DEVICE — SYR 50ML SLIP TIP W/O NDL 309654

## (undated) DEVICE — GOWN IMPERVIOUS BREATHABLE SMART XLG 89045

## (undated) DEVICE — GLOVE BIOGEL PI ULTRATOUCH G SZ 6.5 42165

## (undated) DEVICE — PAD POS XL 1X20X40IN PINK PIGAZZI

## (undated) DEVICE — DRAPE IOBAN INCISE 23X17" 6650EZ

## (undated) DEVICE — ENDO OBTURATOR ACCESS PORT BLADELESS 8X100MM IAS8-100LP

## (undated) DEVICE — SOL NACL 0.9% INJ 1000ML BAG 2B1324X

## (undated) DEVICE — CUSTOM PACK DA VINCI GYN SMA5BDVHEA

## (undated) DEVICE — PREP CHLORAPREP 26ML TINTED HI-LITE ORANGE 930815

## (undated) DEVICE — DECANTER VIAL 2006S

## (undated) DEVICE — DRAPE U SPLIT 74X120" 29440

## (undated) DEVICE — SUTURE VICRYL+ 0 36IN CT-1 UND VCP946H

## (undated) DEVICE — SUTURE MONOCRYL 3-0 18 PS2 UND MCP497G

## (undated) DEVICE — DRAPE SHEET TABLE COVER KC 42301*

## (undated) DEVICE — DAVINCI XI DRAPE ARM 470015

## (undated) DEVICE — DRAPE LAP/CHOLE W/O POUCH 89225

## (undated) DEVICE — DAVINCI XI SEAL UNIVERSAL 5-12MM 470500

## (undated) DEVICE — NEEDLE HYPO MAGELLAN SAFETY 22GA 1 1/2IN 8881850215

## (undated) DEVICE — DRAPE POUCH INSTRUMENT 3 POCKET 1018L

## (undated) DEVICE — PREP DYNA-HEX 4% CHG SCRUB 4OZ BOTTLE MDS098710

## (undated) DEVICE — SU DERMABOND ADVANCED .7ML DNX12

## (undated) DEVICE — PROTECTOR ARM STANDARD ONE STEP

## (undated) DEVICE — LUBRICANT INST ELECTROLUBE EL101

## (undated) DEVICE — SYR 10ML FINGER CONTROL W/O NDL 309695

## (undated) DEVICE — GLOVE BIOGEL PI ULTRATOUCH G SZ 7.0 42170

## (undated) DEVICE — GLOVE SURGEON PI ORTHO SZ 6-1/2 LF

## (undated) DEVICE — NEEDLE SPINAL DISP 22X4-3/4 QUIN 333315

## (undated) DEVICE — PACK TRENGUARD 450 PROCEDURAL 2065406

## (undated) DEVICE — SYR 20ML LL W/O NDL 302830

## (undated) DEVICE — SU STRATAFIX PDS 0 CT-2 30CM SXPP1B405

## (undated) DEVICE — GOWN IMPERVIOUS BREATHABLE SMART LG 89015

## (undated) DEVICE — TUBING FILTER TRI-LUMEN AIRSEAL ASC-EVAC1

## (undated) DEVICE — DAVINCI XI DRAPE COLUMN 470341

## (undated) DEVICE — BLADE KNIFE SURG 11 371111

## (undated) DEVICE — SOL WATER IRRIG 1000ML BOTTLE 2F7114

## (undated) DEVICE — MAT FLOOR SURGICAL 40X38 0702140238

## (undated) DEVICE — RETR ELEV / UTERINE MANIPULATOR V-CARE SM CUP 60-6085-200A

## (undated) DEVICE — ANTIFOG SOLUTION SEE SHARP 150M TROCAR SWABS 30978

## (undated) DEVICE — DAVINCI HOT SHEARS TIP COVER  400180

## (undated) DEVICE — DAVINCI XI OBTURATOR BLADELESS 8MM 470359

## (undated) DEVICE — SUCTION STRYKERFLOW II 250-070-500

## (undated) DEVICE — NEEDLE HYPO 18X1-1/2 SAFETY 305918

## (undated) DEVICE — GLOVE UNDER INDICATOR PI SZ 7.0 LF 41670

## (undated) DEVICE — SUCTION MANIFOLD NEPTUNE 2 SYS 1 PORT 702-025-000

## (undated) DEVICE — SUTURE VICRYL+ 0 27IN CT-1 UND VCP260H

## (undated) DEVICE — POUCH TISSUE RETRIEVAL ROBOTIC 8MM 5.1" INTRO TRS-ROBO-8

## (undated) DEVICE — DRAPE LEGGINGS 8421

## (undated) RX ORDER — FENTANYL CITRATE 50 UG/ML
INJECTION, SOLUTION INTRAMUSCULAR; INTRAVENOUS
Status: DISPENSED
Start: 2024-06-11

## (undated) RX ORDER — BUPIVACAINE HYDROCHLORIDE 5 MG/ML
INJECTION, SOLUTION EPIDURAL; INTRACAUDAL
Status: DISPENSED
Start: 2024-06-11

## (undated) RX ORDER — INDOCYANINE GREEN AND WATER 25 MG
KIT INJECTION
Status: DISPENSED
Start: 2024-06-11

## (undated) RX ORDER — KETOROLAC TROMETHAMINE 30 MG/ML
INJECTION, SOLUTION INTRAMUSCULAR; INTRAVENOUS
Status: DISPENSED
Start: 2024-06-11